# Patient Record
(demographics unavailable — no encounter records)

---

## 2025-03-26 NOTE — PHYSICAL EXAM
[No Acute Distress] : no acute distress [Well Nourished] : well nourished [Well Developed] : well developed [Well-Appearing] : well-appearing [Normal Voice/Communication] : normal voice/communication [Normal Sclera/Conjunctiva] : normal sclera/conjunctiva [EOMI] : extraocular movements intact [Normal Outer Ear/Nose] : the outer ears and nose were normal in appearance [No Lymphadenopathy] : no lymphadenopathy [Supple] : supple [Thyroid Normal, No Nodules] : the thyroid was normal and there were no nodules present [No Respiratory Distress] : no respiratory distress  [No Accessory Muscle Use] : no accessory muscle use [Clear to Auscultation] : lungs were clear to auscultation bilaterally [Normal Rate] : normal rate  [Regular Rhythm] : with a regular rhythm [Normal S1, S2] : normal S1 and S2 [No Murmur] : no murmur heard [No Edema] : there was no peripheral edema [Soft] : abdomen soft [Non-distended] : non-distended [No CVA Tenderness] : no CVA  tenderness [No Joint Swelling] : no joint swelling [No Rash] : no rash [Normal Gait] : normal gait [Speech Grossly Normal] : speech grossly normal [Memory Grossly Normal] : memory grossly normal [Normal Affect] : the affect was normal [Alert and Oriented x3] : oriented to person, place, and time [Normal Mood] : the mood was normal [Normal Insight/Judgement] : insight and judgment were intact [Acne] : no acne

## 2025-03-26 NOTE — ASSESSMENT
[FreeTextEntry1] : #Oversleeping - chronic  Unclear if pathologic or if she's a "long sleeper" naturally requiring more sleep - Check labs r/o IDWA - Can consider sleep specialist and sleep study in future  #HCM - Pap smear: This year wnl - Contraception: Partner has vasectomy - Vaccines: HPV - received 2/3 / Tdap - unsure >>> can get vaccines here on Tue/Thu if interested, counseling provided - BP/BMI wnl  - PHQ-2 negative, has a therapist/ - Refer to breast wellness clinic  - Diet: Advised protein and fiber optimized plant-forward whole food diet with calorie control, minimize ultra-processed foods - Exercise: Strive for regular physical activity throughout the day (walks, exercise snacks, house chores, gardening, hiking, taking stairs, playing sports - everything counts). Optimally, advised to incorporate both cardio and weightlifting every week to improve cardiorespiratory fitness as well as muscle and bone strength.

## 2025-03-26 NOTE — HEALTH RISK ASSESSMENT
[No] : In the past 12 months have you used drugs other than those required for medical reasons? No [Never] : Never [Little interest or pleasure doing things] : 1) Little interest or pleasure doing things [Feeling down, depressed, or hopeless] : 2) Feeling down, depressed, or hopeless [0] : 2) Feeling down, depressed, or hopeless: Not at all (0) [PHQ-2 Negative - No further assessment needed] : PHQ-2 Negative - No further assessment needed [de-identified] : Sober for 5 years  [de-identified] : Walking [de-identified] : Alley WHITLEY / Generally healthy   [[id number]] : 0

## 2025-03-26 NOTE — HEALTH RISK ASSESSMENT
[No] : In the past 12 months have you used drugs other than those required for medical reasons? No [Never] : Never [Little interest or pleasure doing things] : 1) Little interest or pleasure doing things [Feeling down, depressed, or hopeless] : 2) Feeling down, depressed, or hopeless [0] : 2) Feeling down, depressed, or hopeless: Not at all (0) [PHQ-2 Negative - No further assessment needed] : PHQ-2 Negative - No further assessment needed [de-identified] : Sober for 5 years  [de-identified] : Walking [de-identified] : Alley WHITLEY / Generally healthy   [[id number]] : 0

## 2025-03-26 NOTE — HISTORY OF PRESENT ILLNESS
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, although stress better now Has had stress related issues such teeth clenching, uses a mouth guard   Had COL 3 years ago for likely stress related GI issues, reassuring  Gyn/Sexual Hx Menses: Cycles are regular, no sig dysmenorrhea Sexually active: Yes - male partner  Contraception: Partner has vasectomy  History of STIs: No  Has done genetic testing for breast cancer where her lifetime risk was calculated as 20.3% but she's not sure if she answered all the questions correctly  Family hx as listed   HCM - Pap smear: This year wnl - Vaccines: HPV - received 2/3 / Tdap - unsure   Labs from Feb 2025 reviewed and uploaded

## 2025-03-26 NOTE — HISTORY OF PRESENT ILLNESS
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, although stress better now Has had stress related issues such teeth clenching, uses a mouth guard   Had COL 3 years ago for likely stress related GI issues, reassuring  Gyn/Sexual Hx Menses: Cycles are regular, no sig dysmenorrhea Sexually active: Yes - male partner  Contraception: Partner has vasectomy  History of STIs: No  Has done genetic testing for breast cancer where her lifetime risk was calculated as 20.3% but she's not sure if she answered all the questions correctly  Family hx as listed   HCM - Pap smear: This year wnl - Vaccines: HPV - received 2/3 / Tdap - unsure   Labs from Feb 2025 reviewed and uploaded